# Patient Record
Sex: MALE | URBAN - METROPOLITAN AREA
[De-identification: names, ages, dates, MRNs, and addresses within clinical notes are randomized per-mention and may not be internally consistent; named-entity substitution may affect disease eponyms.]

---

## 2018-08-24 ENCOUNTER — TRANSFERRED RECORDS (OUTPATIENT)
Dept: HEALTH INFORMATION MANAGEMENT | Facility: CLINIC | Age: 58
End: 2018-08-24

## 2020-02-10 ENCOUNTER — TRANSFERRED RECORDS (OUTPATIENT)
Dept: HEALTH INFORMATION MANAGEMENT | Facility: CLINIC | Age: 60
End: 2020-02-10

## 2020-04-06 ENCOUNTER — TRANSFERRED RECORDS (OUTPATIENT)
Dept: HEALTH INFORMATION MANAGEMENT | Facility: CLINIC | Age: 60
End: 2020-04-06

## 2020-06-23 ENCOUNTER — TRANSFERRED RECORDS (OUTPATIENT)
Dept: HEALTH INFORMATION MANAGEMENT | Facility: CLINIC | Age: 60
End: 2020-06-23

## 2020-10-13 ENCOUNTER — TRANSCRIBE ORDERS (OUTPATIENT)
Dept: OTHER | Age: 60
End: 2020-10-13

## 2020-10-13 DIAGNOSIS — D86.9 SARCOIDOSIS: Primary | ICD-10-CM

## 2020-12-02 ENCOUNTER — TRANSFERRED RECORDS (OUTPATIENT)
Dept: HEALTH INFORMATION MANAGEMENT | Facility: CLINIC | Age: 60
End: 2020-12-02

## 2020-12-07 ENCOUNTER — TRANSFERRED RECORDS (OUTPATIENT)
Dept: HEALTH INFORMATION MANAGEMENT | Facility: CLINIC | Age: 60
End: 2020-12-07

## 2020-12-08 ENCOUNTER — TELEPHONE (OUTPATIENT)
Dept: CARDIOLOGY | Facility: CLINIC | Age: 60
End: 2020-12-08

## 2020-12-15 ENCOUNTER — TELEPHONE (OUTPATIENT)
Dept: CARDIOLOGY | Facility: CLINIC | Age: 60
End: 2020-12-15

## 2020-12-15 NOTE — TELEPHONE ENCOUNTER
[facility] Call Center    Phone Message    May a detailed message be left on voicemail: yes     Reason for Call: Appointment Intake    Referring Provider Name:  [facility]     Clinic P: [phone] X [phone]  Diagnosis and/or Symptoms: Sarcoidosis    Action Taken: Message routed to:  [facility] cardiovascular surgery    Travel Screening: Not Applicable

## 2020-12-17 ENCOUNTER — CARE COORDINATION (OUTPATIENT)
Dept: CARDIOLOGY | Facility: CLINIC | Age: 60
End: 2020-12-17

## 2020-12-17 DIAGNOSIS — I10 BENIGN ESSENTIAL HYPERTENSION: ICD-10-CM

## 2020-12-17 DIAGNOSIS — I49.3 PVC'S (PREMATURE VENTRICULAR CONTRACTIONS): ICD-10-CM

## 2020-12-17 DIAGNOSIS — F11.90 METHADONE USE: ICD-10-CM

## 2020-12-17 DIAGNOSIS — F11.91 HISTORY OF HEROIN USE: ICD-10-CM

## 2020-12-17 DIAGNOSIS — I42.8 NONISCHEMIC CARDIOMYOPATHY (H): ICD-10-CM

## 2020-12-17 DIAGNOSIS — Z95.810 AUTOMATIC IMPLANTABLE CARDIOVERTER-DEFIBRILLATOR IN SITU: ICD-10-CM

## 2020-12-17 DIAGNOSIS — D86.85 CARDIAC SARCOIDOSIS: ICD-10-CM

## 2020-12-17 DIAGNOSIS — B18.2 CHRONIC HEPATITIS C VIRUS INFECTION (H): ICD-10-CM

## 2020-12-17 DIAGNOSIS — I47.10 PAROXYSMAL SUPRAVENTRICULAR TACHYCARDIA (H): ICD-10-CM

## 2020-12-17 DIAGNOSIS — Z79.01 CHRONIC ANTICOAGULATION: ICD-10-CM

## 2020-12-17 DIAGNOSIS — N52.9 ED (ERECTILE DYSFUNCTION): ICD-10-CM

## 2020-12-17 NOTE — PROGRESS NOTES
Referral- Heart Failure    REFERRING CLINIC INFORMATION:    Referring Clinic: [facility]  Referring Provider: Shania Méndez  Provider Contact Info: Clinic P: [phone]  Nursing Care Team Contact Info: Tiffanie [phone] Direct    REFERRING PATIENT INFORMATION:    Patient Phone Number:   Home Phone [phone]   Mobile [phone]      Consent to Communicate: ?  Insurance Obtained: ?      PATIENT/REFERRAL Hx:       December 17, 2020  [facility] Ritesh [facility] for an appt. Sarcoid related. It appears patient has had a PET done. It appears referring clinic had called in to refer to us in October but message was not routed to the right department.        IMAGING REQUESTED        Left message to have images sent over.     PLAN:       Sending to [facility] Nurse to Triage      ATTEMPTS TO CONTACT:  1. December 8th, 2020 LVM  2. December 17, 2020 LVM  3. January 6, 2021 - Made appt for 2/23 with Dr. Sánchez.     Garland Carter [facility]  Heart Failure, Advanced Heart Failure & CORE  Referral Specialist &     [facility]  Cardiology  Office: [phone]  [address] [place] [address]

## 2020-12-17 NOTE — TELEPHONE ENCOUNTER
December 8 - left voicemail for [facility]iffanie at [facility] waiting for call back.     Garland Carter [facility]  Heart Failure, Advanced Heart Failure & CORE  Referral Specialist &     [facility]  Cardiology  Office: [phone]  [phone]

## 2020-12-21 NOTE — PROGRESS NOTES
[facility] but went to wrong department and finally came to Cardiology. Consult is for cardiac sarcoid but we are unable to view records/notes/results in care everywhere. Apparently, there is a note that mentioned [facility], but I am unaware if they do cardiac PET scans there.  We have been having difficulty getting records or communication back from the [facility].    12/18 - I called and talked to patient directly.  He reported he is aware of the referral but is not really sure of the situation with his records either.  He will need [facility] authorization for care at the .    12/21 - I called the [facility] at the number given ([phone]).  Recording mentioned it is the [facility] Care Line and left a message requesting someone call me to discuss information needed to complete the referral.    1/6/21 - Reviewed records from [facility].  Patient's last EF was 35% and he does not have heart failure symptoms.  Will schedule sarcoid referral with Dr. Sánchez.  Called and spoke with patient.  He would prefer a face-to face-visit with Dr. Sánchez so he can bring his wife to discuss treatment recommendations as he doesn't understand well over the phone.  Dr. Sánchez' next date/time for face to face visit is Feb 23rd at 4:00 which patient feels is good so his wife can get there w/o missing work.  [facility] and we will call and check with Imaging dept to make sure it is in our system.            12/23 - Received message that  had talked to Tiffanie at the [facility] and the patient records would be sent by the end of the day.

## 2020-12-28 ENCOUNTER — TRANSFERRED RECORDS (OUTPATIENT)
Dept: HEALTH INFORMATION MANAGEMENT | Facility: CLINIC | Age: 60
End: 2020-12-28

## 2021-01-04 PROBLEM — B18.2 CHRONIC HEPATITIS C VIRUS INFECTION (H): Status: ACTIVE | Noted: 2021-01-04

## 2021-01-04 PROBLEM — F11.91 HISTORY OF HEROIN USE: Status: ACTIVE | Noted: 2021-01-04

## 2021-01-04 PROBLEM — D86.85 CARDIAC SARCOIDOSIS: Status: ACTIVE | Noted: 2021-01-04

## 2021-01-04 PROBLEM — I42.8 NONISCHEMIC CARDIOMYOPATHY (H): Status: ACTIVE | Noted: 2021-01-04

## 2021-01-04 PROBLEM — F11.90 METHADONE USE: Status: ACTIVE | Noted: 2021-01-04

## 2021-01-04 PROBLEM — Z95.810 AUTOMATIC IMPLANTABLE CARDIOVERTER-DEFIBRILLATOR IN SITU: Status: ACTIVE | Noted: 2021-01-04

## 2021-01-04 PROBLEM — I47.10 PAROXYSMAL SUPRAVENTRICULAR TACHYCARDIA (H): Status: ACTIVE | Noted: 2021-01-04

## 2021-01-04 PROBLEM — I10 BENIGN ESSENTIAL HYPERTENSION: Status: ACTIVE | Noted: 2021-01-04

## 2021-01-04 PROBLEM — I49.3 PVC'S (PREMATURE VENTRICULAR CONTRACTIONS): Status: ACTIVE | Noted: 2021-01-04

## 2021-01-04 PROBLEM — Z79.01 CHRONIC ANTICOAGULATION: Status: ACTIVE | Noted: 2021-01-04

## 2021-01-04 PROBLEM — N52.9 ED (ERECTILE DYSFUNCTION): Status: ACTIVE | Noted: 2021-01-04

## 2021-01-04 RX ORDER — POLYETHYLENE GLYCOL 3350 17 G
2 POWDER IN PACKET (EA) ORAL
COMMUNITY

## 2021-01-04 RX ORDER — METOPROLOL SUCCINATE 100 MG/1
1.5 TABLET, EXTENDED RELEASE ORAL DAILY
COMMUNITY

## 2021-01-20 NOTE — TELEPHONE ENCOUNTER
Action    Action Taken 1-20: Requested from [facility]:    Echo            Cardiac cath   12-2-20    2-10-20    4-6-20    Most recent, if any   2-3: confirmed PET scan and CT Chest are in PACS. Sent second request for echo and cath  2-9: received echo and cath images from [facility]. Gave to Merritt for upload into PACS    02.23.2021 Image received via disc, took to Merritt to upload to chart. RM

## 2021-02-12 DIAGNOSIS — D86.85 CARDIAC SARCOIDOSIS: Primary | ICD-10-CM

## 2021-02-20 NOTE — PROGRESS NOTES
Chief complaint: possible cardiac sarcoidosis     HPI:   Ritesh Rose is a 60 year old male with history of  NICM, paroxysmal SVT, s/p PVC ablation/AT ablation/ICD implantation in 06/20, non-obstructive CAD, HTN who presents for further evaluation of possible cardiac sarcoidosis.     Patient was diagnosed with new HF on 7/2018 with LVEF 35-40% after presenting with NSTEMI. Coronary angiogram at that time showed mild non-obstructive CAD. He then had a cardiac MRI in 8/2018 which showed scattered patchy areas of sub epicardial and mid myocardial delayed enhancement. He had a PET scan on 2/2020 which showed increased uptake in basal anterior/anteroseptal wall consistent with active sarcoid. He had an endomyocardial biopsy on 6/2020 which showed no evidence of sarcoidosis and a coronary angiogram that showed mild-non obstructive CAD. Due to reported conflicting data between his PET scan and his cardiac biopsy, he was not started on any medications. He had a dual-chamber ICD implant for primary prevention and papillary muscle PVC ablation, atrial tachycardia ablation,  on 6/23/20. Ziopatch noted 18.5% PVC burden. He had an inappropriate shock on 11/28/20 for a tachycardia episode which lasted 1 minute 28 seconds. On review of device interrogation,  noted that it was likely 1:1 atrial flutter/atrial tachycardia. He was then started on anticoagulation with apixaban and his metoprolol was increased to 150mg daily . He was referred to [facility] for further evaluation of his cardiac sarcoidosis.     He reported that he remains fairly active at work with no limitation. He works for environmental services and is able to tolerate the level of activity; however, he did endorse that he is fatigued during his days off. He denies any chest pain, PND, orthopnea, peripheral edema, palpitations, lightheadedness or syncope.     PAST MEDICAL HISTORY:  Past Medical History:   Diagnosis Date     Automatic implantable  cardioverter-defibrillator in situ 1/4/2021     Benign essential hypertension 1/4/2021     Cardiac sarcoidosis 1/4/2021     Chronic anticoagulation for atrial arrhythmias 1/4/2021     ED (erectile dysfunction) 1/4/2021     History of heroin use 1/4/2021     Methadone use (H) 1/4/2021     Nonischemic cardiomyopathy (H) 1/4/2021     Paroxysmal supraventricular tachycardia (H) 1/4/2021     PVC's (premature ventricular contractions) 1/4/2021       CURRENT MEDICATIONS:  Current Outpatient Medications   Medication Sig Dispense Refill     apixaban ANTICOAGULANT (ELIQUIS) 5 MG tablet Take 5 mg by mouth 2 times daily       bumetanide (BUMEX) 2 MG tablet Take 2 mg by mouth       Cholecalciferol (SUPER DAILY D3) 25 MCG /0.028ML LIQD        diclofenac (VOLTAREN) 1 % topical gel        digoxin (LANOXIN) 125 MCG tablet Take 125 mcg by mouth       fish oil-omega-3 fatty acids 1000 MG capsule Take 1 capsule by mouth       metoprolol succinate ER (TOPROL-XL) 100 MG 24 hr tablet Take 1.5 tablets by mouth daily       sacubitril-valsartan (ENTRESTO) 24-26 MG per tablet Take 1 tablet by mouth 2 times daily       sodium chloride (OCEAN) 0.65 % nasal spray Spray 1 spray into both nostrils daily as needed       Specialty Vitamins Products (VITAMINS FOR HAIR) TABS Take 1 tablet by mouth       Testosterone 20.25 MG/1.25GM (1.62%) GEL Place 20.25 mg onto the skin       vitamin E (TOCOPHEROL) 400 units (180 mg) capsule Take 400 Units by mouth       apixaban ANTICOAGULANT (ELIQUIS) 5 MG tablet TAKE ONE TABLET BY MOUTH EVERY 12 HOURS TO PREVENT AND/OR TREAT BLOOD CLOTS       naloxone (NARCAN) 4 MG/0.1ML nasal spray Spray 4 mg into one nostril alternating nostrils once as needed every 2-3 minutes until assistance arrives       nicotine (COMMIT) 2 MG lozenge Place 2 mg inside cheek every hour as needed       psyllium (METAMUCIL/KONSYL) 58.6 % powder Take 1 Tablespoonful by mouth 2 times daily In juice or water       sacubitril-valsartan (ENTRESTO)  "24-26 MG per tablet TAKE 1 TABLET BY MOUTH TWICE A DAY         PAST SURGICAL HISTORY:  No past surgical history on file.    ALLERGIES:   No Known Allergies    FAMILY HISTORY:  Mother- heart attack during her late 30s  No family history of sudden cardiac death     SOCIAL HISTORY:  Social History     Tobacco Use     Smoking status: Current Every Day Smoker     Packs/day: 0.50     Types: Cigarettes     Smokeless tobacco: Never Used   Substance Use Topics     Alcohol use: None     Drug use: None       ROS:   A comprehensive 14 point review of systems is negative other than as mentioned in HPI.    Exam:  BP (!) 169/98 (BP Location: Right arm, Patient Position: Chair, Cuff Size: Adult Large)   Pulse 91   Ht 1.727 m (5' 8\")   Wt 114.3 kg (252 lb)   SpO2 91%   BMI 38.32 kg/m    GENERAL APPEARANCE: healthy, alert and no distress  EYES: no icterus, no xanthelasmas  ENT: normal palate, mucosa moist, no central cyanosis  NECK: JVP not elevated  RESPIRATORY: lungs clear to auscultation - no rales, rhonchi or wheezes, no use of accessory muscles, no retractions, respirations are unlabored, normal respiratory rate  CARDIOVASCULAR: regular rhythm, normal S1 with physiologic split S2, no S3 or S4 and no murmur, click or rub.  GI: soft, non tender, bowel sounds normal,no abdominal bruits  EXTREMITIES: no edema, no bruits  NEURO: alert and oriented to person/place/time, normal speech, gait and affect  VASC: Radial, dorsalis pedis and posterior tibialis pulses 2+ bilaterally.  SKIN: no ecchymoses, no rashes.  PSYCH: cooperative, affect appropriate.     Labs:  Reviewed.     Testing/Procedures:    I personally visualized and interpreted:  FDG-PET from [facility] 2/25/20 (images also reviewed with Dr. Burris of Radiology):  Diffuse myocardial uptake suggesting inadequate myocardial suppression. No extracardiac FDG activity to suggest extracardiac sarcoidosis.     Outside results of note:  Outside records from [facility] were " obtained, and relevant results/notes have been incorporated into HPI.  TTE 12/2020  TTE 12/2/2020  Summary:  1. Left ventricle is mildly dilated. Left ventricular systolic function is  reduced. LV EF 35%.  2. Global LV hypokinesis noted.  3. Cardiac device lead noted in the right ventricle.  4. Right ventricle is mildly enlarged. RV systolic function is moderately  reduced.  5. Mild-moderate tricuspid regurgitation.  6. Mild to moderate aortic valve sclerosis/calcification without any evidence  of aortic stenosis.  7. Moderate pulmonary hypertension.  8. The estimated systolic pulmonary artery pressure is 44.6 mmHg above right  atrial pressure.  9. Compared to echo report of 1/2019: LV EF and PA pressures are essentially  unchanged. Echo report of 2/2020 appears to be an outlier with marginally  lower LV EF.        Chest CT 2/10/20: Impression:        1. No specific findings to suggest a diagnosis of pulmonary         sarcoidosis. No evidence of pulmonary nodule or hilar adenopathy.         2. Cardiomegaly with patchy perihilar groundglass opacities and         mild interlobular septal thickening, which may reflect mild         volume overload and mild central edema.         3. Mild prominence of nonenlarged mediastinal lymph nodes, likely         congestive in the setting of heart failure.         4. The main pulmonary artery is enlarged, measuring 36 mm in         diameter, which can be seen in pulmonary artery hypertension.    Echo (02/07/20):    1. Left ventricular ejection fraction, by visual estimation, is 25 to 30%.    2. Mild concentric left ventricular hypertrophy.    3. Global cardiomyopathy.    4. Moderately to severely dilated left atrium.    5. Mild aortic valve sclerosis without stenosis.    6. Moderately enlarged right ventricle.    7. Moderate tricuspid regurgitation.    8. Moderately to severely dilated right atrium.    9. The inferior vena cava was severly dilated.    10.LVEF is slightly lower and  IVC size is slightly greater than an ECHO done 3        weeks ago.    Echo (01/16/19):    1. Left ventricular ejection fraction, by visual estimation, is 30 to 35%.    2. Global cardiomyopathy.    3. Diffuse hypokinesis.    4. Mild RV dilation with mildly reduced global systolic function.    5. Pulmonary hypertension is moderate.    6. Dilated pulmonary artery. Estimated pulmonary artery pressure is 41 mmHg        above RA pressure.    In comparison to the previous echocardiogram(s): Prior examinations are        available and were reviewed for comparison purposes. No change compared        with prior study from 7/30/18.    Echo (07/30/18):    - Technically challenging study due to poor acoustic windows.    1. Mildly dilated left ventricle with mildly to moderately reduced systolic        function with a visually estimated ejection fraction of 35-40%. There is        mild to moderate hypokinesis.    2. The right ventricle is mildly dilated with reduced systolic function.    3. Moderate tricuspid regurgitation.    4. There is moderate thickening of the aortic valve without stenosis by        Doppler.    5. There is non-specific thikening of the mitral valve.    6. The estimated systolic pulmonary artery pressure is 27.9 mmHg above right        atrial pressure.    7. The inferior vena cava was moderately dilated with blunted respirophasic        response consistent with increased right atrial pressure.    Cardiac Cath (07/30/18):    - Summary:   Nonobstructive CAD      Dominance: Left dominant    - Left Main                         Normal      LAD (overall)                     Normal      CIRCUMFLEX (overall)   10%        Luminal irregularities                                        dominant vessel       RCA (overall)                     Luminal irregularities                                        small, non-dominant RCA     - FINAL DIAGNOSIS:      - Mild non-obstructive CAD       - LVEDP 18-20 mmHg     Cardiac MRI  (08/24/18):    1. Scattered patchy areas of sub epicardial and mid myocardial delayed        gadolinium enhancement primarily intraventricular septum, inferior wall,        and superior and inferior hinge points of the right ventricular insertion.        Findings nonspecific, but consistent infiltrating disease, including        sarcoid.     2. Thin band of subendocardial semicircumferential edema superior, lateral,        and inferior left ventricle, of uncertain significance. Normal myocardial        perfusion.     3. Ejection fraction estimated at 30%. Diffuse left ventricular hypokinesia.     4. Concentric left ventricular myocardial hypertrophy.     5. Cardiac enlargement, particularly left ventricle and left atrium.     6. 3.8 cm pulmonary artery, enlarged, consistent with pulmonary arterial        hypertension.     7. Small tricuspid regurgitation.     Cardiac PET (02/19/20):    1. Resting myocardial perfusion with rubidium was abnormal. There was a small,        moderate perfusion abnormality in the basal anterior and anteroseptal        walls.    2. With 18 FDG imaging there was uptake in the basal anterior and anteroseptal        walls.    3. The uptake pattern is consistent with active cardiac sarcoidosis or other        inflammatory process.    4. Severe global hypokinesis.    5. Resting LVEF 16%.    6. LV cavity size was moderately enlarged ( ml).    Assessment and Plan:   1.Heart failure with reduced ejection fraction secondary to NICM (LVEF 35%, 12/2020)   2.Possible cardiac sarcoidosis   He was initially worked up for etiology of his NICM after presenting with new heart failure and no evidence of significant CAD in 6/2018. Cardiac MRI was done on 8/2018 which demonstrated patchy areas of LGE but was not diagnostic for cardiac sarcoidosis. He then had a cardiac PET on 2/2020 which was read as active for cardiac sarcoid. An endomyocardial biopsy done on 6/2020 did not demonstrate any evidence of  sarcoidosis at that time so no therapy was started.  Patient s most recent angiogram from 6/2020 showed mild non-obstructive CAD. A DC-ICD was implanted for primary prevention given persistently depressed LV function. His last hospitalization for acute heart failure exacerbation was from 2/2020 and has otherwise been asymptomatic. He had an inappropriate ICD shock on 11/2020 due to atrial flutter/atrial tachycardia so anticoagulation was started.   Reviewed FDG-PET images from [facility] with Dr. Burris; both he and I agree that the myocardial FDG uptake is diffuse and more consistent with inadequate myocardial suppression than with active inflammation. There is also no evidence of extracardiac sarcoidosis on this study.   At this point, there is no compelling evidence to support a diagnosis of cardiac sarcoidosis in Ritesh.   Plan to obtain repeat PET-CT with our standard, more aggressive dietary preparation to ensure adequate suppression of background myocardial glucose uptake. CMR images have been obtained.  His images will be reviewed and his case discussed at our upcoming multidisciplinary sarcoidosis conference.  BB: metoprolol XL 100mg daily   ACEi/ARB/ARNI: entresto 24-26mg BID   Diuretic: none   ICD: Had CRT-D implanted on 6/2020   - Repeat PET CT as above    3.Atrial flutter   4.History of SVT s/p papillary muscle ablation 6/2020  5. History of atrial tachycardia s/p ablation 6/2020   Noted on most recent device check from 11/2020. His last atrial flutter/atrial tachycardia episode led to an inappropriate shock. CHADSVASC 2 (HF, HTN).  Rate control: metoprolol XL 100mg daily   Anticoagulation: Eliquis 5mg BID     The patient states understanding and is agreeable with plan.   Patient was evaluated with    Follow-up in 6 weeks     Allie Martinez   PGY 5 Cardiology    ATTENDING ATTESTATION     Patient was seen and evaluated with Dr. Martinez. History was confirmed personally by me. Labs, imaging  studies, EKGs, and telemetry were reviewed. Agree with assessment and plan as outlined above. The note has been edited by me as needed to produce a single, cohesive document.     Total time spent (including chart review): 65 minutes    Kam Sánchez MD  Staff Cardiologist          [facility]

## 2021-02-23 ENCOUNTER — PRE VISIT (OUTPATIENT)
Dept: CARDIOLOGY | Facility: CLINIC | Age: 61
End: 2021-02-23

## 2021-02-23 ENCOUNTER — OFFICE VISIT (OUTPATIENT)
Dept: CARDIOLOGY | Facility: CLINIC | Age: 61
End: 2021-02-23
Payer: COMMERCIAL

## 2021-02-23 VITALS
OXYGEN SATURATION: 91 % | BODY MASS INDEX: 38.19 KG/M2 | HEIGHT: 68 IN | HEART RATE: 91 BPM | DIASTOLIC BLOOD PRESSURE: 98 MMHG | SYSTOLIC BLOOD PRESSURE: 169 MMHG | WEIGHT: 252 LBS

## 2021-02-23 DIAGNOSIS — D86.85 CARDIAC SARCOIDOSIS: Primary | ICD-10-CM

## 2021-02-23 DIAGNOSIS — I47.10 SVT (SUPRAVENTRICULAR TACHYCARDIA) (H): ICD-10-CM

## 2021-02-23 DIAGNOSIS — I48.92 PAROXYSMAL ATRIAL FLUTTER (H): ICD-10-CM

## 2021-02-23 DIAGNOSIS — I50.22 CHRONIC SYSTOLIC HEART FAILURE (H): ICD-10-CM

## 2021-02-23 DIAGNOSIS — D86.85 CARDIAC SARCOIDOSIS: ICD-10-CM

## 2021-02-23 LAB
ANION GAP SERPL CALCULATED.3IONS-SCNC: 4 MMOL/L (ref 3–14)
BUN SERPL-MCNC: 18 MG/DL (ref 7–30)
CALCIUM SERPL-MCNC: 8.9 MG/DL (ref 8.5–10.1)
CHLORIDE SERPL-SCNC: 101 MMOL/L (ref 94–109)
CO2 SERPL-SCNC: 35 MMOL/L (ref 20–32)
CREAT SERPL-MCNC: 1.09 MG/DL (ref 0.66–1.25)
GFR SERPL CREATININE-BSD FRML MDRD: 72 ML/MIN/{1.73_M2}
GLUCOSE SERPL-MCNC: 77 MG/DL (ref 70–99)
NT-PROBNP SERPL-MCNC: 1926 PG/ML (ref 0–125)
POTASSIUM SERPL-SCNC: 4 MMOL/L (ref 3.4–5.3)
SODIUM SERPL-SCNC: 139 MMOL/L (ref 133–144)
TROPONIN I SERPL-MCNC: 0.05 UG/L (ref 0–0.04)

## 2021-02-23 PROCEDURE — 83880 ASSAY OF NATRIURETIC PEPTIDE: CPT | Performed by: PATHOLOGY

## 2021-02-23 PROCEDURE — 80048 BASIC METABOLIC PNL TOTAL CA: CPT | Performed by: PATHOLOGY

## 2021-02-23 PROCEDURE — G0463 HOSPITAL OUTPT CLINIC VISIT: HCPCS

## 2021-02-23 PROCEDURE — 36415 COLL VENOUS BLD VENIPUNCTURE: CPT | Performed by: PATHOLOGY

## 2021-02-23 PROCEDURE — 99205 OFFICE O/P NEW HI 60 MIN: CPT | Mod: GC | Performed by: INTERNAL MEDICINE

## 2021-02-23 PROCEDURE — 84484 ASSAY OF TROPONIN QUANT: CPT | Performed by: PATHOLOGY

## 2021-02-23 RX ORDER — CHOLECALCIFEROL (VITAMIN D3) 10MCG/DROP
DROPS ORAL
COMMUNITY
Start: 2020-04-03

## 2021-02-23 RX ORDER — TESTOSTERONE 20.25 MG/1.25G
20.25 GEL TOPICAL
COMMUNITY
Start: 2020-04-03

## 2021-02-23 RX ORDER — VITAMIN E 268 MG
400 CAPSULE ORAL
COMMUNITY
Start: 2020-04-03

## 2021-02-23 RX ORDER — CHLORAL HYDRATE 500 MG
1 CAPSULE ORAL
COMMUNITY
Start: 2020-04-03

## 2021-02-23 RX ORDER — DIGOXIN 125 MCG
125 TABLET ORAL
COMMUNITY
Start: 2020-04-03

## 2021-02-23 RX ORDER — BUMETANIDE 2 MG/1
2 TABLET ORAL
COMMUNITY
Start: 2020-04-03

## 2021-02-23 RX ORDER — MULTIVITAMIN
1 TABLET ORAL
COMMUNITY
Start: 2020-04-03

## 2021-02-23 ASSESSMENT — PAIN SCALES - GENERAL: PAINLEVEL: NO PAIN (0)

## 2021-02-23 ASSESSMENT — MIFFLIN-ST. JEOR: SCORE: 1922.56

## 2021-02-23 NOTE — NURSING NOTE
Diet: Patient instructed regarding a heart failure healthy diet, including discussion of reduced fat and 2000 mg daily sodium restriction, daily weights, medication purpose and compliance, fluid restrictions and resources for patient and family to access for assistance with heart failure management.       Labs: Patient was given results of the laboratory testing obtained today and patient was instructed about when to return for the next laboratory testing.     Med Reconcile: Reviewed and verified all current medications with the patient. The updated medication list was printed and given to the patient.     Med changes: no changes    Return Appointment: Patient given instructions regarding scheduling next clinic visit: PET [facility] in 8 weeks    Patient stated he understood all health information given and agreed to call with further questions or concerns.     Libia Castro RN

## 2021-02-23 NOTE — NURSING NOTE
Chief Complaint   Patient presents with     New Patient     [facility] for possible [facility]oid, labs prior     Vitals were taken and medication reconciled.    ANTHONY Pineda  4:23 PM

## 2021-02-23 NOTE — PATIENT INSTRUCTIONS
"  Cardiology Providers you saw during your visit:  Dr. Sánchez    Medication changes:  1.  No changes    Follow up:  1.  Follow up with Dr Sánchez in 2 months, after PET scan is completed  Labs:  Results for REMY FRANKLIN (MRN [id number]) as of 2/23/2021 17:00   Ref. Range 2/23/2021 15:56   Sodium Latest Ref Range: 133 - 144 mmol/L 139   Potassium Latest Ref Range: 3.4 - 5.3 mmol/L 4.0   Chloride Latest Ref Range: 94 - 109 mmol/L 101   Carbon Dioxide Latest Ref Range: 20 - 32 mmol/L 35 (H)   Urea Nitrogen Latest Ref Range: 7 - 30 mg/dL 18   Creatinine Latest Ref Range: 0.66 - 1.25 mg/dL 1.09   GFR Estimate Latest Ref Range: >60 mL/min/1.73_m2 72   GFR Estimate If Black Latest Ref Range: >60 mL/min/1.73_m2 84   Calcium Latest Ref Range: 8.5 - 10.1 mg/dL 8.9   Anion Gap Latest Ref Range: 3 - 14 mmol/L 4   N-Terminal Pro Bnp Latest Ref Range: 0 - 125 pg/mL 1,926 (H)   Troponin I ES Latest Ref Range: 0.000 - 0.045 ug/L 0.050 (H)   Glucose Latest Ref Range: 70 - 99 mg/dL 77       Please call if you have :  1. Weight gain of more than 2 pounds in a day or 5 pounds in a week  2. Increased shortness of breath, swelling or bloating  3. Dizziness, lightheadedness   4. Any questions or concerns.       Follow the American Heart Association Diet and Lifestyle recommendations:  Limit saturated fat, trans fat, sodium, red meat, sweets and sugar-sweetened beverages. If you choose to eat red meat, compare labels and select the leanest cuts available.  Aim for at least 150 minutes of moderate physical activity or 75 minutes of vigorous physical activity - or an equal combination of both - each week.      During business hours: [phone], press option # 1 to be routed to the [employer] then option # 4 for \"To send a message to your care team\"      After hours, weekends or holidays: On Call Cardiologist- [phone]   option #4 and ask to speak to the on-call Cardiologist. Inform them you are a CORE/heart failure patient at " "the [place].      Libia Castro RN BSN CHFN  Cardiology Care Coordinator - Heart Failure/ C.O.R.E. Clinic  [facility]   Questions and [phone]   First press #1 for the [employer] and then press #4 for \"To send a message to your care team\"    "

## 2021-02-26 ENCOUNTER — PATIENT OUTREACH (OUTPATIENT)
Dept: CARDIOLOGY | Facility: CLINIC | Age: 61
End: 2021-02-26

## 2021-02-26 NOTE — TELEPHONE ENCOUNTER
Called and left message for Cathy Nice (community care coordinator) and also called [facility] and was provided with the fax number for Dr Limon's office.  Faxed orders to [phone].

## 2021-03-02 NOTE — TELEPHONE ENCOUNTER
Called and attempted to leave message for [facility] clinic.  Was given number for care in [facility] [phone] and a new fax number [phone].  Will attempt to refax again.

## 2021-03-05 NOTE — TELEPHONE ENCOUNTER
Received call from Dr Limon, stating that she had received the orders and would clarify what needed to be done.  Will [place] back next week

## 2021-04-13 ENCOUNTER — ANCILLARY PROCEDURE (OUTPATIENT)
Dept: PET IMAGING | Facility: CLINIC | Age: 61
End: 2021-04-13
Attending: INTERNAL MEDICINE
Payer: COMMERCIAL

## 2021-04-13 DIAGNOSIS — D86.9 SARCOIDOSIS: Primary | ICD-10-CM

## 2021-04-13 LAB — GLUCOSE SERPL-MCNC: 93 MG/DL (ref 70–99)

## 2021-04-15 NOTE — PROGRESS NOTES
Ritesh Rose is a 60 year old male who is being evaluated via a billable telephone visit.        Telephone Visit Details  Type of service:  Telephone Visit  Telephone visit start time: 10:54 AM  Telephone end time: 11:11 AM  Total telephone time: 17 minutes      Chief complaint: possible cardiac sarcoidosis     HPI:   Ritesh Rose is a 60 year old male with history of  NICM, paroxysmal SVT, s/p PVC ablation/AT ablation/ICD implantation in 06/20, non-obstructive CAD, HTN who presents for further evaluation of possible cardiac sarcoidosis.     2/23/2021  Patient was diagnosed with new HF on 7/2018 with LVEF 35-40% after presenting with NSTEMI. Coronary angiogram at that time showed mild non-obstructive CAD. He then had a cardiac MRI in 8/2018 which showed scattered patchy areas of sub epicardial and mid myocardial delayed enhancement. He had a PET scan on 2/2020 which showed increased uptake in basal anterior/anteroseptal wall consistent with active sarcoid. He had an endomyocardial biopsy on 6/2020 which showed no evidence of sarcoidosis and a coronary angiogram that showed mild-non obstructive CAD. Due to reported conflicting data between his PET scan and his cardiac biopsy, he was not started on any medications. He had a dual-chamber ICD implant for primary prevention and papillary muscle PVC ablation, atrial tachycardia ablation,  on 6/23/20. Ziopatch noted 18.5% PVC burden. He had an inappropriate shock on 11/28/20 for a tachycardia episode which lasted 1 minute 28 seconds. On review of device interrogation,  noted that it was likely 1:1 atrial flutter/atrial tachycardia. He was then started on anticoagulation with apixaban and his metoprolol was increased to 150mg daily . He was referred to [facility] for further evaluation of his cardiac sarcoidosis.   He reported that he remains fairly active at work with no limitation. He works for environmental services and is able to tolerate the level of  "activity; however, he did endorse that he is fatigued during his days off. He denies any chest pain, PND, orthopnea, peripheral edema, palpitations, lightheadedness or syncope.     04/20/21:  Since his last visit, he still reports feeling generally \"run down\" and that he has dyspnea with relatively minimal exertion. He feels overall worse since his last visit in February and that his exertional capacity has decreased. He has no resting dyspnea; no orthopnea or PND.      PAST MEDICAL HISTORY:  Past Medical History:   Diagnosis Date     Automatic implantable cardioverter-defibrillator in situ 1/4/2021     Benign essential hypertension 1/4/2021     Cardiac sarcoidosis 1/4/2021     Chronic anticoagulation for atrial arrhythmias 1/4/2021     ED (erectile dysfunction) 1/4/2021     History of heroin use 1/4/2021     Methadone use (H) 1/4/2021     Nonischemic cardiomyopathy (H) 1/4/2021     Paroxysmal supraventricular tachycardia (H) 1/4/2021     PVC's (premature ventricular contractions) 1/4/2021       CURRENT MEDICATIONS:  Current Outpatient Medications   Medication Sig Dispense Refill     apixaban ANTICOAGULANT (ELIQUIS) 5 MG tablet TAKE ONE TABLET BY MOUTH EVERY 12 HOURS TO PREVENT AND/OR TREAT BLOOD CLOTS       apixaban ANTICOAGULANT (ELIQUIS) 5 MG tablet Take 5 mg by mouth 2 times daily       bumetanide (BUMEX) 2 MG tablet Take 2 mg by mouth       Cholecalciferol (SUPER DAILY D3) 25 MCG /0.028ML LIQD        diclofenac (VOLTAREN) 1 % topical gel        digoxin (LANOXIN) 125 MCG tablet Take 125 mcg by mouth       fish oil-omega-3 fatty acids 1000 MG capsule Take 1 capsule by mouth       metoprolol succinate ER (TOPROL-XL) 100 MG 24 hr tablet Take 1.5 tablets by mouth daily       naloxone (NARCAN) 4 MG/0.1ML nasal spray Spray 4 mg into one nostril alternating nostrils once as needed every 2-3 minutes until assistance arrives       nicotine (COMMIT) 2 MG lozenge Place 2 mg inside cheek every hour as needed       psyllium " (METAMUCIL/KONSYL) 58.6 % powder Take 1 Tablespoonful by mouth 2 times daily In juice or water       sacubitril-valsartan (ENTRESTO) 24-26 MG per tablet TAKE 1 TABLET BY MOUTH TWICE A DAY       sacubitril-valsartan (ENTRESTO) 24-26 MG per tablet Take 1 tablet by mouth 2 times daily       sodium chloride (OCEAN) 0.65 % nasal spray Spray 1 spray into both nostrils daily as needed       Specialty Vitamins Products (VITAMINS FOR HAIR) TABS Take 1 tablet by mouth       Testosterone 20.25 MG/1.25GM (1.62%) GEL Place 20.25 mg onto the skin       vitamin E (TOCOPHEROL) 400 units (180 mg) capsule Take 400 Units by mouth         PAST SURGICAL HISTORY:  No past surgical history on file.    ALLERGIES:   No Known Allergies    FAMILY HISTORY:  Mother- heart attack during her late 30s  No family history of sudden cardiac death     SOCIAL HISTORY:  Social History     Tobacco Use     Smoking status: Current Every Day Smoker     Packs/day: 0.50     Types: Cigarettes     Smokeless tobacco: Never Used   Substance Use Topics     Alcohol use: Not on file     Drug use: Not on file       ROS:   A comprehensive 14 point review of systems is negative other than as mentioned in HPI.    Exam:  No audible dyspnea, speaking in full sentences.   Remainder of exam cannot be performed due to telephone visit.     Labs:  Reviewed.     Testing/Procedures:  Cardiac PET 4/13/2021  Impression:  1. No evidence of active cardiac sarcoidosis.  2. No evidence of active pulmonary or systemic sarcoidosis.  3. Cardiomegaly.  4. No perfusion deficit demonstrated on N-13 Ammonia imaging.  5. Normal myocardial blood flow.  6. Diffuse hypokinesia of the left ventricular myocardium, consistent  with nonischemic cardiomyopathy given the normal perfusion.   7. Low LV ejection fraction of 25%.  8. Incidentally noted, gynecomastia left greater than right.    FDG-PET from [facility] 2/25/20 (images also reviewed with Dr. Burris of Radiology):  Diffuse myocardial uptake  suggesting inadequate myocardial suppression. No extracardiac FDG activity to suggest extracardiac sarcoidosis.     I personally visualized and interpreted:  NH3/FDG-PET 4/13/21: No abnormal cardiac or extracardiac FDG uptake to suggest active sarcoidosis. No perfusion deficit demonstrated on 13-NH3 perfusion imaging. LVEF=25%.    Outside results of note:  Outside records from [facility] were obtained, and relevant results/notes have been incorporated into HPI.  TTE 12/2020  Summary:  1. Left ventricle is mildly dilated. Left ventricular systolic function is  reduced. LV EF 35%.  2. Global LV hypokinesis noted.  3. Cardiac device lead noted in the right ventricle.  4. Right ventricle is mildly enlarged. RV systolic function is moderately  reduced.  5. Mild-moderate tricuspid regurgitation.  6. Mild to moderate aortic valve sclerosis/calcification without any evidence  of aortic stenosis.  7. Moderate pulmonary hypertension.  8. The estimated systolic pulmonary artery pressure is 44.6 mmHg above right  atrial pressure.  9. Compared to echo report of 1/2019: LV EF and PA pressures are essentially  unchanged. Echo report of 2/2020 appears to be an outlier with marginally  lower LV EF.    Chest CT 2/10/20: Impression:        1. No specific findings to suggest a diagnosis of pulmonary         sarcoidosis. No evidence of pulmonary nodule or hilar adenopathy.         2. Cardiomegaly with patchy perihilar groundglass opacities and         mild interlobular septal thickening, which may reflect mild         volume overload and mild central edema.         3. Mild prominence of nonenlarged mediastinal lymph nodes, likely         congestive in the setting of heart failure.         4. The main pulmonary artery is enlarged, measuring 36 mm in         diameter, which can be seen in pulmonary artery hypertension.    Echo (02/07/20):    1. Left ventricular ejection fraction, by visual estimation, is 25 to 30%.    2. Mild  concentric left ventricular hypertrophy.    3. Global cardiomyopathy.    4. Moderately to severely dilated left atrium.    5. Mild aortic valve sclerosis without stenosis.    6. Moderately enlarged right ventricle.    7. Moderate tricuspid regurgitation.    8. Moderately to severely dilated right atrium.    9. The inferior vena cava was severly dilated.    10.LVEF is slightly lower and IVC size is slightly greater than an ECHO done 3        weeks ago.    Echo (01/16/19):    1. Left ventricular ejection fraction, by visual estimation, is 30 to 35%.    2. Global cardiomyopathy.    3. Diffuse hypokinesis.    4. Mild RV dilation with mildly reduced global systolic function.    5. Pulmonary hypertension is moderate.    6. Dilated pulmonary artery. Estimated pulmonary artery pressure is 41 mmHg        above RA pressure.    In comparison to the previous echocardiogram(s): Prior examinations are        available and were reviewed for comparison purposes. No change compared        with prior study from 7/30/18.    Echo (07/30/18):    - Technically challenging study due to poor acoustic windows.    1. Mildly dilated left ventricle with mildly to moderately reduced systolic        function with a visually estimated ejection fraction of 35-40%. There is        mild to moderate hypokinesis.    2. The right ventricle is mildly dilated with reduced systolic function.    3. Moderate tricuspid regurgitation.    4. There is moderate thickening of the aortic valve without stenosis by        Doppler.    5. There is non-specific thikening of the mitral valve.    6. The estimated systolic pulmonary artery pressure is 27.9 mmHg above right        atrial pressure.    7. The inferior vena cava was moderately dilated with blunted respirophasic        response consistent with increased right atrial pressure.    Cardiac Cath (07/30/18):    - Summary:   Nonobstructive CAD      Dominance: Left dominant    - Left Main                          Normal      LAD (overall)                     Normal      CIRCUMFLEX (overall)   10%        Luminal irregularities                                        dominant vessel       RCA (overall)                     Luminal irregularities                                        small, non-dominant RCA     - FINAL DIAGNOSIS:      - Mild non-obstructive CAD       - LVEDP 18-20 mmHg     Cardiac MRI (08/24/18):    1. Scattered patchy areas of sub epicardial and mid myocardial delayed        gadolinium enhancement primarily intraventricular septum, inferior wall,        and superior and inferior hinge points of the right ventricular insertion.        Findings nonspecific, but consistent infiltrating disease, including        sarcoid.     2. Thin band of subendocardial semicircumferential edema superior, lateral,        and inferior left ventricle, of uncertain significance. Normal myocardial        perfusion.     3. Ejection fraction estimated at 30%. Diffuse left ventricular hypokinesia.     4. Concentric left ventricular myocardial hypertrophy.     5. Cardiac enlargement, particularly left ventricle and left atrium.     6. 3.8 cm pulmonary artery, enlarged, consistent with pulmonary arterial        hypertension.     7. Small tricuspid regurgitation.     Cardiac PET (02/19/20):    1. Resting myocardial perfusion with rubidium was abnormal. There was a small,        moderate perfusion abnormality in the basal anterior and anteroseptal        walls.    2. With 18 FDG imaging there was uptake in the basal anterior and anteroseptal        walls.    3. The uptake pattern is consistent with active cardiac sarcoidosis or other        inflammatory process.    4. Severe global hypokinesis.    5. Resting LVEF 16%.    6. LV cavity size was moderately enlarged ( ml).    Assessment and Plan:   1.Heart failure with reduced ejection fraction secondary to NICM (LVEF 35%, 12/2020)   2.Possible cardiac sarcoidosis   I did previously  review FDG-PET images from [facility] with Dr. Burris; both he and I agree that the myocardial FDG uptake is diffuse and more consistent with inadequate myocardial suppression than with active inflammation. There is also no evidence of extracardiac sarcoidosis on this study. His repeat PET-CT done here at [facility] showed no evidence of active cardiac sarcoidosis. At this point, on balance, there is no compelling evidence to suggest that Mr. Rose's cardiomyopathy is due to cardiac sarcoidosis and no further workup for sarcoidosis is recommended at this time.  [facility] L[facility] and Carol, from who he has received excellent care.     Kam Sánchez MD  Cardiology

## 2021-04-20 ENCOUNTER — VIRTUAL VISIT (OUTPATIENT)
Dept: CARDIOLOGY | Facility: CLINIC | Age: 61
End: 2021-04-20
Attending: INTERNAL MEDICINE
Payer: COMMERCIAL

## 2021-04-20 DIAGNOSIS — I50.22 CHRONIC SYSTOLIC HEART FAILURE (H): Primary | ICD-10-CM

## 2021-04-20 PROCEDURE — 99213 OFFICE O/P EST LOW 20 MIN: CPT | Mod: TEL | Performed by: INTERNAL MEDICINE

## 2021-04-20 NOTE — PATIENT INSTRUCTIONS
"Cardiology Providers you saw during your visit:  Dr. Sánchez    Medication changes:  No changes    Follow up:  [facility]    Please call if you have :  1. Weight gain of more than 2 pounds in a day or 5 pounds in a week  2. Increased shortness of breath, swelling or bloating  3. Dizziness, lightheadedness   4. Any questions or concerns.       Follow the American Heart Association Diet and Lifestyle recommendations:  Limit saturated fat, trans fat, sodium, red meat, sweets and sugar-sweetened beverages. If you choose to eat red meat, compare labels and select the leanest cuts available.  Aim for at least 150 minutes of moderate physical activity or 75 minutes of vigorous physical activity - or an equal combination of both - each week.      During business hours: [phone], press option # 1 to be routed to the [facility] then option # 4 for \"To send a message to your care team\"      After hours, weekends or holidays: On Call Cardiologist- [phone]   option #4 and ask to speak to the on-call Cardiologist. Inform them you are a CORE/heart failure patient at the [facility].      Libia Castro RN BSN CHFN  Cardiology Care Coordinator - Heart Failure/ [facility]  [facility]   Questions and [phone]   First press #1 for the [facility] and then press #4 for \"To send a message to your care team\"    "

## 2021-04-20 NOTE — NURSING NOTE
Med changes: no med changes    Return Appointment: Patient given instructions regarding scheduling next clinic visit: [facility]    Patient stated he understood all health information given and agreed to call with further questions or concerns.     Libia Castro RN

## 2021-04-20 NOTE — PROGRESS NOTES
Ritesh Rose is a 60 year old who is being evaluated via a billable video visit.      The patient declined video appointment today. The patient was offered to reschedule but declined this as well.     Bahman Gomez, EMT  10:21 AM

## 2024-01-19 NOTE — LETTER
Problem: Pain Acute  Goal: Acceptable Pain Control and Functional Ability  Outcome: Ongoing, Progressing     Problem: Fall Injury Risk  Goal: Absence of Fall and Fall-Related Injury  Outcome: Ongoing, Progressing     Problem: Adult Inpatient Plan of Care  Goal: Absence of Hospital-Acquired Illness or Injury  Outcome: Ongoing, Progressing  Goal: Optimal Comfort and Wellbeing  Outcome: Ongoing, Progressing  Goal: Readiness for Transition of Care  Outcome: Ongoing, Progressing     Problem: Infection  Goal: Absence of Infection Signs and Symptoms  Outcome: Ongoing, Progressing      4/20/2021      RE: Ritesh Rose  [address]  [facility] [address]       Dear Colleague,    Thank you for the opportunity to participate in the care of your patient, Ritesh Rose, at the [facility] [place] at [facility]. Please see a copy of my visit note below.    Ritesh Rose is a 60 year old male who is being evaluated via a billable telephone visit.        Telephone Visit Details  Type of service:  Telephone Visit  Telephone visit start time: 10:54 AM  Telephone end time: 11:11 AM  Total telephone time: 17 minutes      Chief complaint: possible cardiac sarcoidosis     HPI:   Ritesh Rose is a 60 year old male with history of  NICM, paroxysmal SVT, s/p PVC ablation/AT ablation/ICD implantation in 06/20, non-obstructive CAD, HTN who presents for further evaluation of possible cardiac sarcoidosis.     2/23/2021  Patient was diagnosed with new HF on 7/2018 with LVEF 35-40% after presenting with NSTEMI. Coronary angiogram at that time showed mild non-obstructive CAD. He then had a cardiac MRI in 8/2018 which showed scattered patchy areas of sub epicardial and mid myocardial delayed enhancement. He had a PET scan on 2/2020 which showed increased uptake in basal anterior/anteroseptal wall consistent with active sarcoid. He had an endomyocardial biopsy on 6/2020 which showed no evidence of sarcoidosis and a coronary angiogram that showed mild-non obstructive CAD. Due to reported conflicting data between his PET scan and his cardiac biopsy, he was not started on any medications. He had a dual-chamber ICD implant for primary prevention and papillary muscle PVC ablation, atrial tachycardia ablation,  on 6/23/20. Nohemy noted 18.5% PVC burden. He had an inappropriate shock on 11/28/20 for a tachycardia episode which lasted 1 minute 28 seconds. On review of device interrogation,  noted that it was likely 1:1 atrial  "flutter/atrial tachycardia. He was then started on anticoagulation with apixaban and his metoprolol was increased to 150mg daily . He was referred to [facility] for further evaluation of his cardiac sarcoidosis.   He reported that he remains fairly active at work with no limitation. He works for environmental services and is able to tolerate the level of activity; however, he did endorse that he is fatigued during his days off. He denies any chest pain, PND, orthopnea, peripheral edema, palpitations, lightheadedness or syncope.     04/20/21:  Since his last visit, he still reports feeling generally \"run down\" and that he has dyspnea with relatively minimal exertion. He feels overall worse since his last visit in February and that his exertional capacity has decreased. He has no resting dyspnea; no orthopnea or PND.      PAST MEDICAL HISTORY:  Past Medical History:   Diagnosis Date     Automatic implantable cardioverter-defibrillator in situ 1/4/2021     Benign essential hypertension 1/4/2021     Cardiac sarcoidosis 1/4/2021     Chronic anticoagulation for atrial arrhythmias 1/4/2021     ED (erectile dysfunction) 1/4/2021     History of heroin use 1/4/2021     Methadone use (H) 1/4/2021     Nonischemic cardiomyopathy (H) 1/4/2021     Paroxysmal supraventricular tachycardia (H) 1/4/2021     PVC's (premature ventricular contractions) 1/4/2021       CURRENT MEDICATIONS:  Current Outpatient Medications   Medication Sig Dispense Refill     apixaban ANTICOAGULANT (ELIQUIS) 5 MG tablet TAKE ONE TABLET BY MOUTH EVERY 12 HOURS TO PREVENT AND/OR TREAT BLOOD CLOTS       apixaban ANTICOAGULANT (ELIQUIS) 5 MG tablet Take 5 mg by mouth 2 times daily       bumetanide (BUMEX) 2 MG tablet Take 2 mg by mouth       Cholecalciferol (SUPER DAILY D3) 25 MCG /0.028ML LIQD        diclofenac (VOLTAREN) 1 % topical gel        digoxin (LANOXIN) 125 MCG tablet Take 125 mcg by mouth       fish oil-omega-3 fatty acids 1000 MG capsule Take 1 capsule by " mouth       metoprolol succinate ER (TOPROL-XL) 100 MG 24 hr tablet Take 1.5 tablets by mouth daily       naloxone (NARCAN) 4 MG/0.1ML nasal spray Spray 4 mg into one nostril alternating nostrils once as needed every 2-3 minutes until assistance arrives       nicotine (COMMIT) 2 MG lozenge Place 2 mg inside cheek every hour as needed       psyllium (METAMUCIL/KONSYL) 58.6 % powder Take 1 Tablespoonful by mouth 2 times daily In juice or water       sacubitril-valsartan (ENTRESTO) 24-26 MG per tablet TAKE 1 TABLET BY MOUTH TWICE A DAY       sacubitril-valsartan (ENTRESTO) 24-26 MG per tablet Take 1 tablet by mouth 2 times daily       sodium chloride (OCEAN) 0.65 % nasal spray Spray 1 spray into both nostrils daily as needed       Specialty Vitamins Products (VITAMINS FOR HAIR) TABS Take 1 tablet by mouth       Testosterone 20.25 MG/1.25GM (1.62%) GEL Place 20.25 mg onto the skin       vitamin E (TOCOPHEROL) 400 units (180 mg) capsule Take 400 Units by mouth         PAST SURGICAL HISTORY:  No past surgical history on file.    ALLERGIES:   No Known Allergies    FAMILY HISTORY:  Mother- heart attack during her late 30s  No family history of sudden cardiac death     SOCIAL HISTORY:  Social History     Tobacco Use     Smoking status: Current Every Day Smoker     Packs/day: 0.50     Types: Cigarettes     Smokeless tobacco: Never Used   Substance Use Topics     Alcohol use: Not on file     Drug use: Not on file       ROS:   A comprehensive 14 point review of systems is negative other than as mentioned in HPI.    Exam:  No audible dyspnea, speaking in full sentences.   Remainder of exam cannot be performed due to telephone visit.     Labs:  Reviewed.     Testing/Procedures:  Cardiac PET 4/13/2021  Impression:  1. No evidence of active cardiac sarcoidosis.  2. No evidence of active pulmonary or systemic sarcoidosis.  3. Cardiomegaly.  4. No perfusion deficit demonstrated on N-13 Ammonia imaging.  5. Normal myocardial blood  flow.  6. Diffuse hypokinesia of the left ventricular myocardium, consistent  with nonischemic cardiomyopathy given the normal perfusion.   7. Low LV ejection fraction of 25%.  8. Incidentally noted, gynecomastia left greater than right.    FDG-PET from [facility] 2/25/20 (images also reviewed with Dr. Burris of Radiology):  Diffuse myocardial uptake suggesting inadequate myocardial suppression. No extracardiac FDG activity to suggest extracardiac sarcoidosis.     I personally visualized and interpreted:  NH3/FDG-PET 4/13/21: No abnormal cardiac or extracardiac FDG uptake to suggest active sarcoidosis. No perfusion deficit demonstrated on 13-NH3 perfusion imaging. LVEF=25%.    Outside results of note:  Outside records from [facility] were obtained, and relevant results/notes have been incorporated into HPI.  TTE 12/2020  Summary:  1. Left ventricle is mildly dilated. Left ventricular systolic function is  reduced. LV EF 35%.  2. Global LV hypokinesis noted.  3. Cardiac device lead noted in the right ventricle.  4. Right ventricle is mildly enlarged. RV systolic function is moderately  reduced.  5. Mild-moderate tricuspid regurgitation.  6. Mild to moderate aortic valve sclerosis/calcification without any evidence  of aortic stenosis.  7. Moderate pulmonary hypertension.  8. The estimated systolic pulmonary artery pressure is 44.6 mmHg above right  atrial pressure.  9. Compared to echo report of 1/2019: LV EF and PA pressures are essentially  unchanged. Echo report of 2/2020 appears to be an outlier with marginally  lower LV EF.    Chest CT 2/10/20: Impression:        1. No specific findings to suggest a diagnosis of pulmonary         sarcoidosis. No evidence of pulmonary nodule or hilar adenopathy.         2. Cardiomegaly with patchy perihilar groundglass opacities and         mild interlobular septal thickening, which may reflect mild         volume overload and mild central edema.         3. Mild prominence  of nonenlarged mediastinal lymph nodes, likely         congestive in the setting of heart failure.         4. The main pulmonary artery is enlarged, measuring 36 mm in         diameter, which can be seen in pulmonary artery hypertension.    Echo (02/07/20):    1. Left ventricular ejection fraction, by visual estimation, is 25 to 30%.    2. Mild concentric left ventricular hypertrophy.    3. Global cardiomyopathy.    4. Moderately to severely dilated left atrium.    5. Mild aortic valve sclerosis without stenosis.    6. Moderately enlarged right ventricle.    7. Moderate tricuspid regurgitation.    8. Moderately to severely dilated right atrium.    9. The inferior vena cava was severly dilated.    10.LVEF is slightly lower and IVC size is slightly greater than an ECHO done 3        weeks ago.    Echo (01/16/19):    1. Left ventricular ejection fraction, by visual estimation, is 30 to 35%.    2. Global cardiomyopathy.    3. Diffuse hypokinesis.    4. Mild RV dilation with mildly reduced global systolic function.    5. Pulmonary hypertension is moderate.    6. Dilated pulmonary artery. Estimated pulmonary artery pressure is 41 mmHg        above RA pressure.    In comparison to the previous echocardiogram(s): Prior examinations are        available and were reviewed for comparison purposes. No change compared        with prior study from 7/30/18.    Echo (07/30/18):    - Technically challenging study due to poor acoustic windows.    1. Mildly dilated left ventricle with mildly to moderately reduced systolic        function with a visually estimated ejection fraction of 35-40%. There is        mild to moderate hypokinesis.    2. The right ventricle is mildly dilated with reduced systolic function.    3. Moderate tricuspid regurgitation.    4. There is moderate thickening of the aortic valve without stenosis by        Doppler.    5. There is non-specific thikening of the mitral valve.    6. The estimated systolic  pulmonary artery pressure is 27.9 mmHg above right        atrial pressure.    7. The inferior vena cava was moderately dilated with blunted respirophasic        response consistent with increased right atrial pressure.    Cardiac Cath (07/30/18):    - Summary:   Nonobstructive CAD      Dominance: Left dominant    - Left Main                         Normal      LAD (overall)                     Normal      CIRCUMFLEX (overall)   10%        Luminal irregularities                                        dominant vessel       RCA (overall)                     Luminal irregularities                                        small, non-dominant RCA     - FINAL DIAGNOSIS:      - Mild non-obstructive CAD       - LVEDP 18-20 mmHg     Cardiac MRI (08/24/18):    1. Scattered patchy areas of sub epicardial and mid myocardial delayed        gadolinium enhancement primarily intraventricular septum, inferior wall,        and superior and inferior hinge points of the right ventricular insertion.        Findings nonspecific, but consistent infiltrating disease, including        sarcoid.     2. Thin band of subendocardial semicircumferential edema superior, lateral,        and inferior left ventricle, of uncertain significance. Normal myocardial        perfusion.     3. Ejection fraction estimated at 30%. Diffuse left ventricular hypokinesia.     4. Concentric left ventricular myocardial hypertrophy.     5. Cardiac enlargement, particularly left ventricle and left atrium.     6. 3.8 cm pulmonary artery, enlarged, consistent with pulmonary arterial        hypertension.     7. Small tricuspid regurgitation.     Cardiac PET (02/19/20):    1. Resting myocardial perfusion with rubidium was abnormal. There was a small,        moderate perfusion abnormality in the basal anterior and anteroseptal        walls.    2. With 18 FDG imaging there was uptake in the basal anterior and anteroseptal        walls.    3. The uptake pattern is consistent  with active cardiac sarcoidosis or other        inflammatory process.    4. Severe global hypokinesis.    5. Resting LVEF 16%.    6. LV cavity size was moderately enlarged ( ml).    Assessment and Plan:   1.Heart failure with reduced ejection fraction secondary to NICM (LVEF 35%, 12/2020)   2.Possible cardiac sarcoidosis   I did previously review FDG-PET images from [facility] with Dr. Burris; both he and I agree that the myocardial FDG uptake is diffuse and more consistent with inadequate myocardial suppression than with active inflammation. There is also no evidence of extracardiac sarcoidosis on this study. His repeat PET-CT done here at [facility] showed no evidence of active cardiac sarcoidosis. At this point, on balance, there is no compelling evidence to suggest that Mr. Rose's cardiomyopathy is due to cardiac sarcoidosis and no further workup for sarcoidosis is recommended at this time.  [facility] L[facility] and Carol, from who he has received excellent care.     Kam Sánchez MD  Cardiology            Ritesh Rose is a 60 year old who is being evaluated via a billable video visit.      The patient declined video appointment today. The patient was offered to reschedule but declined this as well.     Bahman Gomez, EMT  10:21 AM      Please do not hesitate to contact me if you have any questions/concerns.     Sincerely,     Kam Sánchez MD